# Patient Record
Sex: FEMALE | Race: OTHER | HISPANIC OR LATINO | URBAN - METROPOLITAN AREA
[De-identification: names, ages, dates, MRNs, and addresses within clinical notes are randomized per-mention and may not be internally consistent; named-entity substitution may affect disease eponyms.]

---

## 2018-03-09 ENCOUNTER — INPATIENT (INPATIENT)
Facility: HOSPITAL | Age: 53
LOS: 2 days | Discharge: HOME | DRG: 313 | End: 2018-03-12
Attending: INTERNAL MEDICINE | Admitting: INTERNAL MEDICINE
Payer: COMMERCIAL

## 2018-03-09 VITALS
HEIGHT: 60 IN | TEMPERATURE: 98 F | DIASTOLIC BLOOD PRESSURE: 80 MMHG | HEART RATE: 82 BPM | RESPIRATION RATE: 18 BRPM | OXYGEN SATURATION: 99 % | WEIGHT: 141.1 LBS | SYSTOLIC BLOOD PRESSURE: 124 MMHG

## 2018-03-09 DIAGNOSIS — Z98.890 OTHER SPECIFIED POSTPROCEDURAL STATES: Chronic | ICD-10-CM

## 2018-03-09 LAB
ALBUMIN SERPL ELPH-MCNC: 4.7 G/DL — SIGNIFICANT CHANGE UP (ref 3.3–5)
ALP SERPL-CCNC: 86 U/L — SIGNIFICANT CHANGE UP (ref 40–120)
ALT FLD-CCNC: 28 U/L — SIGNIFICANT CHANGE UP (ref 10–45)
ANION GAP SERPL CALC-SCNC: 14 MMOL/L — SIGNIFICANT CHANGE UP (ref 5–17)
AST SERPL-CCNC: 19 U/L — SIGNIFICANT CHANGE UP (ref 10–40)
BASOPHILS NFR BLD AUTO: 0.3 % — SIGNIFICANT CHANGE UP (ref 0–2)
BILIRUB SERPL-MCNC: 0.5 MG/DL — SIGNIFICANT CHANGE UP (ref 0.2–1.2)
BUN SERPL-MCNC: 14 MG/DL — SIGNIFICANT CHANGE UP (ref 7–23)
CALCIUM SERPL-MCNC: 9.8 MG/DL — SIGNIFICANT CHANGE UP (ref 8.4–10.5)
CHLORIDE SERPL-SCNC: 98 MMOL/L — SIGNIFICANT CHANGE UP (ref 96–108)
CK MB CFR SERPL CALC: 1.4 NG/ML — SIGNIFICANT CHANGE UP (ref 0–6.7)
CK SERPL-CCNC: 59 U/L — SIGNIFICANT CHANGE UP (ref 25–170)
CO2 SERPL-SCNC: 26 MMOL/L — SIGNIFICANT CHANGE UP (ref 22–31)
CREAT SERPL-MCNC: 0.68 MG/DL — SIGNIFICANT CHANGE UP (ref 0.5–1.3)
D DIMER BLD IA.RAPID-MCNC: 316 NG/ML DDU — HIGH
EOSINOPHIL NFR BLD AUTO: 0.4 % — SIGNIFICANT CHANGE UP (ref 0–6)
EXTRA BLUE TOP TUBE: SIGNIFICANT CHANGE UP
EXTRA SST TUBE: SIGNIFICANT CHANGE UP
GLUCOSE BLDC GLUCOMTR-MCNC: 104 MG/DL — HIGH (ref 70–99)
GLUCOSE SERPL-MCNC: 118 MG/DL — HIGH (ref 70–99)
HCG UR QL: NEGATIVE — SIGNIFICANT CHANGE UP
HCT VFR BLD CALC: 41.7 % — SIGNIFICANT CHANGE UP (ref 34.5–45)
HGB BLD-MCNC: 13.2 G/DL — SIGNIFICANT CHANGE UP (ref 11.5–15.5)
LYMPHOCYTES # BLD AUTO: 29.4 % — SIGNIFICANT CHANGE UP (ref 13–44)
MCHC RBC-ENTMCNC: 28.1 PG — SIGNIFICANT CHANGE UP (ref 27–34)
MCHC RBC-ENTMCNC: 31.7 G/DL — LOW (ref 32–36)
MCV RBC AUTO: 88.9 FL — SIGNIFICANT CHANGE UP (ref 80–100)
MONOCYTES NFR BLD AUTO: 7.5 % — SIGNIFICANT CHANGE UP (ref 2–14)
NEUTROPHILS NFR BLD AUTO: 62.4 % — SIGNIFICANT CHANGE UP (ref 43–77)
PLATELET # BLD AUTO: 237 K/UL — SIGNIFICANT CHANGE UP (ref 150–400)
POTASSIUM SERPL-MCNC: 3.8 MMOL/L — SIGNIFICANT CHANGE UP (ref 3.5–5.3)
POTASSIUM SERPL-SCNC: 3.8 MMOL/L — SIGNIFICANT CHANGE UP (ref 3.5–5.3)
PROT SERPL-MCNC: 7.9 G/DL — SIGNIFICANT CHANGE UP (ref 6–8.3)
RBC # BLD: 4.69 M/UL — SIGNIFICANT CHANGE UP (ref 3.8–5.2)
RBC # FLD: 13.5 % — SIGNIFICANT CHANGE UP (ref 10.3–16.9)
SODIUM SERPL-SCNC: 138 MMOL/L — SIGNIFICANT CHANGE UP (ref 135–145)
TROPONIN T SERPL-MCNC: <0.01 NG/ML — SIGNIFICANT CHANGE UP (ref 0–0.01)
WBC # BLD: 10.3 K/UL — SIGNIFICANT CHANGE UP (ref 3.8–10.5)
WBC # FLD AUTO: 10.3 K/UL — SIGNIFICANT CHANGE UP (ref 3.8–10.5)

## 2018-03-09 PROCEDURE — 78452 HT MUSCLE IMAGE SPECT MULT: CPT | Mod: 26

## 2018-03-09 PROCEDURE — 93010 ELECTROCARDIOGRAM REPORT: CPT

## 2018-03-09 PROCEDURE — 93016 CV STRESS TEST SUPVJ ONLY: CPT

## 2018-03-09 PROCEDURE — 93018 CV STRESS TEST I&R ONLY: CPT

## 2018-03-09 PROCEDURE — 71045 X-RAY EXAM CHEST 1 VIEW: CPT | Mod: 26

## 2018-03-09 PROCEDURE — 99285 EMERGENCY DEPT VISIT HI MDM: CPT | Mod: 25

## 2018-03-09 RX ORDER — ASPIRIN/CALCIUM CARB/MAGNESIUM 324 MG
325 TABLET ORAL ONCE
Qty: 0 | Refills: 0 | Status: COMPLETED | OUTPATIENT
Start: 2018-03-09 | End: 2018-03-09

## 2018-03-09 RX ORDER — ASPIRIN/CALCIUM CARB/MAGNESIUM 324 MG
1 TABLET ORAL
Qty: 0 | Refills: 0 | COMMUNITY

## 2018-03-09 RX ORDER — ACETAMINOPHEN 500 MG
650 TABLET ORAL ONCE
Qty: 0 | Refills: 0 | Status: COMPLETED | OUTPATIENT
Start: 2018-03-09 | End: 2018-03-09

## 2018-03-09 RX ORDER — DEXTROSE 50 % IN WATER 50 %
1 SYRINGE (ML) INTRAVENOUS ONCE
Qty: 0 | Refills: 0 | Status: DISCONTINUED | OUTPATIENT
Start: 2018-03-09 | End: 2018-03-12

## 2018-03-09 RX ORDER — DEXTROSE 50 % IN WATER 50 %
25 SYRINGE (ML) INTRAVENOUS ONCE
Qty: 0 | Refills: 0 | Status: DISCONTINUED | OUTPATIENT
Start: 2018-03-09 | End: 2018-03-12

## 2018-03-09 RX ORDER — ASPIRIN/CALCIUM CARB/MAGNESIUM 324 MG
81 TABLET ORAL DAILY
Qty: 0 | Refills: 0 | Status: DISCONTINUED | OUTPATIENT
Start: 2018-03-09 | End: 2018-03-12

## 2018-03-09 RX ORDER — METOCLOPRAMIDE HCL 10 MG
10 TABLET ORAL ONCE
Qty: 0 | Refills: 0 | Status: COMPLETED | OUTPATIENT
Start: 2018-03-09 | End: 2018-03-09

## 2018-03-09 RX ORDER — INSULIN LISPRO 100/ML
VIAL (ML) SUBCUTANEOUS
Qty: 0 | Refills: 0 | Status: DISCONTINUED | OUTPATIENT
Start: 2018-03-09 | End: 2018-03-12

## 2018-03-09 RX ORDER — GLUCAGON INJECTION, SOLUTION 0.5 MG/.1ML
1 INJECTION, SOLUTION SUBCUTANEOUS ONCE
Qty: 0 | Refills: 0 | Status: DISCONTINUED | OUTPATIENT
Start: 2018-03-09 | End: 2018-03-12

## 2018-03-09 RX ORDER — METOPROLOL TARTRATE 50 MG
25 TABLET ORAL DAILY
Qty: 0 | Refills: 0 | Status: DISCONTINUED | OUTPATIENT
Start: 2018-03-09 | End: 2018-03-12

## 2018-03-09 RX ORDER — ATORVASTATIN CALCIUM 80 MG/1
10 TABLET, FILM COATED ORAL AT BEDTIME
Qty: 0 | Refills: 0 | Status: DISCONTINUED | OUTPATIENT
Start: 2018-03-09 | End: 2018-03-10

## 2018-03-09 RX ORDER — NITROGLYCERIN 6.5 MG
0.4 CAPSULE, EXTENDED RELEASE ORAL
Qty: 0 | Refills: 0 | Status: DISCONTINUED | OUTPATIENT
Start: 2018-03-09 | End: 2018-03-12

## 2018-03-09 RX ORDER — SODIUM CHLORIDE 9 MG/ML
1000 INJECTION, SOLUTION INTRAVENOUS
Qty: 0 | Refills: 0 | Status: DISCONTINUED | OUTPATIENT
Start: 2018-03-09 | End: 2018-03-12

## 2018-03-09 RX ORDER — DEXTROSE 50 % IN WATER 50 %
12.5 SYRINGE (ML) INTRAVENOUS ONCE
Qty: 0 | Refills: 0 | Status: DISCONTINUED | OUTPATIENT
Start: 2018-03-09 | End: 2018-03-12

## 2018-03-09 RX ORDER — SUMATRIPTAN SUCCINATE 4 MG/.5ML
25 INJECTION, SOLUTION SUBCUTANEOUS ONCE
Qty: 0 | Refills: 0 | Status: COMPLETED | OUTPATIENT
Start: 2018-03-09 | End: 2018-03-09

## 2018-03-09 RX ADMIN — SUMATRIPTAN SUCCINATE 25 MILLIGRAM(S): 4 INJECTION, SOLUTION SUBCUTANEOUS at 22:38

## 2018-03-09 RX ADMIN — Medication 325 MILLIGRAM(S): at 18:45

## 2018-03-09 RX ADMIN — Medication 650 MILLIGRAM(S): at 17:41

## 2018-03-09 RX ADMIN — Medication 0.4 MILLIGRAM(S): at 19:35

## 2018-03-09 RX ADMIN — ATORVASTATIN CALCIUM 10 MILLIGRAM(S): 80 TABLET, FILM COATED ORAL at 22:38

## 2018-03-09 RX ADMIN — SUMATRIPTAN SUCCINATE 25 MILLIGRAM(S): 4 INJECTION, SOLUTION SUBCUTANEOUS at 23:15

## 2018-03-09 RX ADMIN — Medication 10 MILLIGRAM(S): at 19:31

## 2018-03-09 NOTE — H&P ADULT - HISTORY OF PRESENT ILLNESS
12 Lead EKG reveals SR NSST changes with PVC-Left bundle morphology Patient is a 52 year old female with PMHx of HLD, DM strong family history of premature CAD who presents to [facility] ED With complaints of chest pain and plalpitations x 3 weeks. Patient reports tightness in the center of chest with no radiation and simultaneously palpitations throughout the day. No aggravating or relieving factors. She admits to ocassional SOB. Denies orthopne, PND, Le edema or syncope. No prior history of CAD/CHF/Arrythmia. 12 Lead EKG reveals SR NSST changes with PVC-Left bundle morphology. troponin negx1 Patient is a 52 year old female with PMHx of HLD, DM strong family history of premature CAD who presents to [facility] ED With complaints of chest pain and plalpitations x 3 weeks. Patient reports tightness in the center of chest with no radiation and simultaneously palpitations throughout the day. No aggravating or relieving factors. She admits to ocassional SOB. Denies orthopnea PND, Le edema or syncope. No prior history of CAD/CHF/Arrythmia. 12 Lead EKG reveals SR NSST changes with PVC-Left bundle morphology. troponin negx1, lytes unremarkable.

## 2018-03-09 NOTE — ED PROVIDER NOTE - ATTENDING CONTRIBUTION TO CARE
53 yo female h/o dm c/o intermittent sscp (tightness) w palpitations x3 weeks, worse w exertion.  No associated sob, n, diaphoresis, dizziness.  Pt noted to have abnl ekg w pmd and sent to cardiologist who noted similar.  Pt states she received an echo (unknown results) .  Sx slightly more intense x 3 d so pt came to ed.  Pt denies  fever, chills, smoking hx, ocp use, recent travel, sick contacts, past surgery, trauma, recent immobilization, fh pe/dvt.  + fh cad.  Well appearing, nad, nc/at, lung cta, heart reg, abd soft/nt, ext no c/c/e/ttp bilat, no gross neuro deficits.  ? acs, arrhythmia, no sig concern for pe, pna, pericarditis.  CXR, labs wnl; discussed w Dr Cabral who wants to admit for further cardiac eval.

## 2018-03-09 NOTE — H&P ADULT - RS GEN PE MLT RESP DETAILS PC
airway patent/good air movement/normal/no rales/breath sounds equal/no chest wall tenderness/no rhonchi/no wheezes

## 2018-03-09 NOTE — ED PROVIDER NOTE - OBJECTIVE STATEMENT
51 y/o female with a PMHx of DM2 (on janumet) is present with intermittent chest tightness and palpitations x3 weeks, however reports increased symptoms x3 days with nausea that occurred today. Pt reports she saw her PMD, Dr. Mccann who noticed "skipped beats" on her EKG and she was referred to Cardiology. As per pt, she received an echo (unknown results) and another EKG (with abnormal results). Pt describes a "chest tightness/discomfort" located mid-sternal that has become increasingly constant. 51 y/o female with a PMHx of DM2 (on janumet) is present with intermittent chest tightness and palpitations x3 weeks, however reports increased symptoms x3 days with nausea that occurred today. Pt reports she saw her PMD, Dr. Mccann who noticed "skipped beats" on her EKG and she was referred to Cardiology. As per pt, she received an echo (unknown results) and another EKG (with abnormal results). Pt describes a "chest tightness/discomfort" located mid-sternal that has become increasingly constant. She denies the following associating symptoms: sweating, sob, difficulty breathing. 53 y/o female with a PMHx of DM2 (on janumet) is present with intermittent chest tightness and palpitations x3 weeks, however reports increased symptoms x3 days with nausea that occurred today. Pt reports she saw her PMD, Dr. Mccann who noticed "skipped beats" on her EKG and she was referred to Cardiology. As per pt, she received an echo (unknown results) and another EKG (with abnormal results). Pt describes a "chest tightness/discomfort" located mid-sternal that has become increasingly constant. She reports pain is worsened when she lays on her side. Denies pain with lying on her back. She denies the following associating symptoms: sweating, sob, difficulty breathing. In addition, she denies the following: recent illness, fever, chills, smoking hx, ocp use, recent travel, sick contacts, past surgery, trauma.

## 2018-03-09 NOTE — H&P ADULT - FAMILY HISTORY
No pertinent family history in first degree relatives Father  Still living? Unknown  Family history of premature CAD, Age at diagnosis: 41-50     Mother  Still living? Unknown  Family history of premature CAD, Age at diagnosis: 41-50     Sibling  Still living? Unknown  Family history of premature CAD, Age at diagnosis: 41-50

## 2018-03-09 NOTE — H&P ADULT - ASSESSMENT
Admit to tele  Serial Trops  Check Echo to eval LVEF  Consider Ischemic eval with nuc Re- Chest pain and PVCs  Check ESR/CRP  Check Lipids  Check TSH  Add ASA 81  Insulin SS Admit to tele  Serial Trops  Check Echo to eval LVEF  Consider Ischemic eval with nuc Re- Chest pain and PVCs  Check ESR/CRP  Check Lipids  Check TSH  Add ASA 81  Add Low dose BB Toprol XL 25  Insulin SS Patient is a 52 year old female with PMHx of HLD, DM strong family history of premature CAD who presents to [facility] ED With complaints of chest pain and plalpitations x 3 weeks. Patient reports tightness in the center of chest with no radiation and simultaneously palpitations throughout the day. No aggravating or relieving factors. She admits to ocassional SOB. Denies orthopnea PND, Le edema or syncope. No prior history of CAD/CHF/Arrythmia. 12 Lead EKG reveals SR NSST changes with PVC-Left bundle morphology. troponin negx1, lytes unremarkable.         Admit to tele  Serial Trops  Check Echo to eval LVEF  Consider Ischemic eval with nuc Re- Chest pain and PVCs  Check ESR/CRP  Check Lipids  Check TSH  Check D-Dimer  Add ASA 81  Add Low dose BB Toprol XL 25  Insulin SS

## 2018-03-09 NOTE — PROGRESS NOTE ADULT - SUBJECTIVE AND OBJECTIVE BOX
Pt is c/o 3 weeks of palpitation s chest tightness and mild dyspnea   No previous cardaic history but Pt is diabetic     PAST MEDICAL & SURGICAL HISTORY:  Migraines  Diabetes type 2, controlled  H/O knee surgery      MEDICATIONS  (STANDING):    MEDICATIONS  (PRN):    Home Medications:  aspirin 81 mg oral tablet: 1 tab(s) orally once a day (09 Mar 2018 17:43)  atorvastatin:  (09 Mar 2018 17:43)  Janumet:  (09 Mar 2018 17:43)    ICU Vital Signs Last 24 Hrs  T(C): 36.8 (09 Mar 2018 16:50), Max: 36.8 (09 Mar 2018 16:50)  T(F): 98.2 (09 Mar 2018 16:50), Max: 98.2 (09 Mar 2018 16:50)  HR: 82 (09 Mar 2018 16:50) (82 - 82)  BP: 124/80 (09 Mar 2018 16:50) (124/80 - 124/80)  BP(mean): --  ABP: --  ABP(mean): --  RR: 18 (09 Mar 2018 16:50) (18 - 18)  SpO2: 99% (09 Mar 2018 16:50) (99% - 99%)      lungs clear  CV s1 s2   abd soft   ext stable     EKG   NSR   old septal infarct

## 2018-03-09 NOTE — ED PROVIDER NOTE - MEDICAL DECISION MAKING DETAILS
53 y/o female with increasing intermittent chest tightness and palpitations with previous abnormal EKGs. VS nml. Skipped heart sounds on auscultation. Lung sounds clear. Pending CE. Consulted with Dr. Cabral - plan for admission for ACS. 51 y/o female with increasing intermittent chest tightness and palpitations with previous abnormal EKGs. VS nml. Skipped heart sounds on auscultation. Lung sounds clear. Pending CE. EKG with PVC - no stemi. Consulted with Dr. Cabral - plan for admission for ACS. Monitor for arrythmia and plan for echo. 51 y/o female with increasing intermittent chest tightness and palpitations with previous abnormal EKGs. VS nml. Skipped heart sounds on auscultation. Lung sounds clear. CE negative. EKG with PVC - no stemi. HA treated with reglan and tylenol. Chest pain treated with ASA and nitro. Consulted with Dr. Cabral - plan for admission for ACS. Monitor for arrythmia and plan for echo.

## 2018-03-09 NOTE — ED ADULT TRIAGE NOTE - ARRIVAL INFO ADDITIONAL COMMENTS
c.o chest pain and palpitations for 3 weeks. seen by pmd and has an appt with cardiology next week. states pain has been worse for past 3 days. denies any sob, dizziness, cough, fever/chills. c.o nausea since this morning, no vomiting.

## 2018-03-09 NOTE — H&P ADULT - NSHPLABSRESULTS_GEN_ALL_CORE
Troponin T, Serum (03.09.18 @ 17:51)    Troponin T, Serum: <0.01: Reference interval for troponin T is </= 0.01 ng/mL which includes the  99th percentile of a healthy population. Troponin T results are not  interchangeable with troponin I results. ng/mL

## 2018-03-10 DIAGNOSIS — E11.9 TYPE 2 DIABETES MELLITUS WITHOUT COMPLICATIONS: ICD-10-CM

## 2018-03-10 DIAGNOSIS — R79.89 OTHER SPECIFIED ABNORMAL FINDINGS OF BLOOD CHEMISTRY: ICD-10-CM

## 2018-03-10 LAB
ANION GAP SERPL CALC-SCNC: 12 MMOL/L — SIGNIFICANT CHANGE UP (ref 5–17)
BUN SERPL-MCNC: 12 MG/DL — SIGNIFICANT CHANGE UP (ref 7–23)
CALCIUM SERPL-MCNC: 9.2 MG/DL — SIGNIFICANT CHANGE UP (ref 8.4–10.5)
CHLORIDE SERPL-SCNC: 98 MMOL/L — SIGNIFICANT CHANGE UP (ref 96–108)
CHOLEST SERPL-MCNC: 202 MG/DL — HIGH (ref 10–199)
CO2 SERPL-SCNC: 27 MMOL/L — SIGNIFICANT CHANGE UP (ref 22–31)
CREAT SERPL-MCNC: 0.75 MG/DL — SIGNIFICANT CHANGE UP (ref 0.5–1.3)
GLUCOSE BLDC GLUCOMTR-MCNC: 119 MG/DL — HIGH (ref 70–99)
GLUCOSE BLDC GLUCOMTR-MCNC: 129 MG/DL — HIGH (ref 70–99)
GLUCOSE BLDC GLUCOMTR-MCNC: 131 MG/DL — HIGH (ref 70–99)
GLUCOSE BLDC GLUCOMTR-MCNC: 136 MG/DL — HIGH (ref 70–99)
GLUCOSE SERPL-MCNC: 160 MG/DL — HIGH (ref 70–99)
HBA1C BLD-MCNC: 7.4 % — HIGH (ref 4–5.6)
HCT VFR BLD CALC: 40.5 % — SIGNIFICANT CHANGE UP (ref 34.5–45)
HDLC SERPL-MCNC: 49 MG/DL — SIGNIFICANT CHANGE UP (ref 40–125)
HGB BLD-MCNC: 13.1 G/DL — SIGNIFICANT CHANGE UP (ref 11.5–15.5)
LIPID PNL WITH DIRECT LDL SERPL: 130 MG/DL — HIGH
MCHC RBC-ENTMCNC: 28.9 PG — SIGNIFICANT CHANGE UP (ref 27–34)
MCHC RBC-ENTMCNC: 32.3 G/DL — SIGNIFICANT CHANGE UP (ref 32–36)
MCV RBC AUTO: 89.4 FL — SIGNIFICANT CHANGE UP (ref 80–100)
PLATELET # BLD AUTO: 211 K/UL — SIGNIFICANT CHANGE UP (ref 150–400)
POTASSIUM SERPL-MCNC: 4.2 MMOL/L — SIGNIFICANT CHANGE UP (ref 3.5–5.3)
POTASSIUM SERPL-SCNC: 4.2 MMOL/L — SIGNIFICANT CHANGE UP (ref 3.5–5.3)
RBC # BLD: 4.53 M/UL — SIGNIFICANT CHANGE UP (ref 3.8–5.2)
RBC # FLD: 13.4 % — SIGNIFICANT CHANGE UP (ref 10.3–16.9)
SODIUM SERPL-SCNC: 137 MMOL/L — SIGNIFICANT CHANGE UP (ref 135–145)
TOTAL CHOLESTEROL/HDL RATIO MEASUREMENT: 4.1 RATIO — SIGNIFICANT CHANGE UP (ref 3.3–7.1)
TRIGL SERPL-MCNC: 113 MG/DL — SIGNIFICANT CHANGE UP (ref 10–149)
TROPONIN T SERPL-MCNC: <0.01 NG/ML — SIGNIFICANT CHANGE UP (ref 0–0.01)
TSH SERPL-MCNC: 1.35 UIU/ML — SIGNIFICANT CHANGE UP (ref 0.35–4.94)
WBC # BLD: 7.1 K/UL — SIGNIFICANT CHANGE UP (ref 3.8–10.5)
WBC # FLD AUTO: 7.1 K/UL — SIGNIFICANT CHANGE UP (ref 3.8–10.5)

## 2018-03-10 RX ORDER — ATORVASTATIN CALCIUM 80 MG/1
40 TABLET, FILM COATED ORAL AT BEDTIME
Qty: 0 | Refills: 0 | Status: DISCONTINUED | OUTPATIENT
Start: 2018-03-10 | End: 2018-03-12

## 2018-03-10 RX ORDER — HYDROCORTISONE 1 %
1 OINTMENT (GRAM) TOPICAL ONCE
Qty: 0 | Refills: 0 | Status: COMPLETED | OUTPATIENT
Start: 2018-03-10 | End: 2018-03-10

## 2018-03-10 RX ORDER — HEPARIN SODIUM 5000 [USP'U]/ML
5000 INJECTION INTRAVENOUS; SUBCUTANEOUS EVERY 8 HOURS
Qty: 0 | Refills: 0 | Status: DISCONTINUED | OUTPATIENT
Start: 2018-03-10 | End: 2018-03-12

## 2018-03-10 RX ADMIN — Medication 81 MILLIGRAM(S): at 11:49

## 2018-03-10 RX ADMIN — ATORVASTATIN CALCIUM 40 MILLIGRAM(S): 80 TABLET, FILM COATED ORAL at 22:17

## 2018-03-10 RX ADMIN — Medication 1 APPLICATION(S): at 22:17

## 2018-03-10 RX ADMIN — Medication 25 MILLIGRAM(S): at 06:24

## 2018-03-10 NOTE — PROGRESS NOTE ADULT - SUBJECTIVE AND OBJECTIVE BOX
Pt aware of palpitations     PAST MEDICAL & SURGICAL HISTORY:  Migraines  Diabetes type 2, controlled  H/O knee surgery    MEDICATIONS  (STANDING):  aspirin enteric coated 81 milliGRAM(s) Oral daily  atorvastatin 10 milliGRAM(s) Oral at bedtime  dextrose 5%. 1000 milliLiter(s) (50 mL/Hr) IV Continuous <Continuous>  dextrose 50% Injectable 12.5 Gram(s) IV Push once  dextrose 50% Injectable 25 Gram(s) IV Push once  dextrose 50% Injectable 25 Gram(s) IV Push once  insulin lispro (HumaLOG) corrective regimen sliding scale   SubCutaneous Before meals and at bedtime  metoprolol succinate ER 25 milliGRAM(s) Oral daily    MEDICATIONS  (PRN):  dextrose Gel 1 Dose(s) Oral once PRN Blood Glucose LESS THAN 70 milliGRAM(s)/deciliter  glucagon  Injectable 1 milliGRAM(s) IntraMuscular once PRN Glucose LESS THAN 70 milligrams/deciliter  nitroglycerin     SubLingual 0.4 milliGRAM(s) SubLingual every 5 minutes PRN Chest Pain    ICU Vital Signs Last 24 Hrs  T(C): 35.7 (10 Mar 2018 10:00), Max: 36.9 (09 Mar 2018 19:35)  T(F): 96.2 (10 Mar 2018 10:00), Max: 98.4 (09 Mar 2018 19:35)  HR: 79 (10 Mar 2018 08:30) (68 - 82)  BP: 121/78 (10 Mar 2018 08:30) (107/56 - 135/69)  BP(mean): 94 (09 Mar 2018 19:35) (94 - 94)  ABP: --  ABP(mean): --  RR: 18 (10 Mar 2018 08:30) (16 - 18)  SpO2: 98% (10 Mar 2018 08:30) (98% - 99%)      lungs clear  CV s1 s2  RSR with ectopy   Abd soft  Ext stable                          13.1   7.1   )-----------( 211      ( 10 Mar 2018 07:34 )             40.5   03-10    137  |  98  |  12  ----------------------------<  160<H>  4.2   |  27  |  0.75    Ca    9.2      10 Mar 2018 07:34    TPro  7.9  /  Alb  4.7  /  TBili  0.5  /  DBili  x   /  AST  19  /  ALT  28  /  AlkPhos  86  03-09  PT/INR - ( 09 Mar 2018 17:51 )   PT: 12.3 sec;   INR: 1.11          PTT - ( 09 Mar 2018 17:51 )  PTT:31.2 secCARDIAC MARKERS ( 10 Mar 2018 07:34 )  x     / <0.01 ng/mL / x     / x     / x      CARDIAC MARKERS ( 09 Mar 2018 17:51 )  x     / <0.01 ng/mL / 59 U/L / x     / 1.4 ng/mL

## 2018-03-10 NOTE — PROVIDER CONTACT NOTE (OTHER) - ASSESSMENT
c/o itchiness  no redness present but pt states this has happened before and she believes she has an allergy to the adhesive

## 2018-03-10 NOTE — PROGRESS NOTE ADULT - ASSESSMENT
Patient is a 53yo F with strong FHx premature CAD and PMHx of HLD, DM-II who presented to [facility] ED on 3/9/18 complaining of CP artur plalpitations x 3 weeks. Patient reports tightness in the center of chest with no radiation and simultaneously palpitations throughout the day. No aggravating or relieving factors. She admits to ocassional SOB. Denies orthopnea PND, Le edema or syncope. No prior history of CAD/CHF/Arrythmia. 12 Lead EKG reveals SR NSST changes with PVC-Left bundle morphology. troponin negx1, lytes unremarkable. Patient is a 51yo F with strong FHx premature CAD and PMHx of HLD, DM-II who presented to [facility] ED on 3/9/18 complaining of CP described as a tightness and palpitations x 3 weeks. Patient was admitted to [facility] for further cardiac workup.

## 2018-03-10 NOTE — PROGRESS NOTE ADULT - SUBJECTIVE AND OBJECTIVE BOX
Interventional Cardiology PA Adult Progress Note    CC: "I am having palpitations"    Subjective Assessment: Patient was seen and examined this AM and reported mild palpitations while walking to the bathroom. Denies CP or SOB.    12 point ROS otherwise negative except per subjective  	  MEDICATIONS:  metoprolol succinate ER 25 milliGRAM(s) Oral daily  nitroglycerin     SubLingual 0.4 milliGRAM(s) SubLingual every 5 minutes PRN  atorvastatin 10 milliGRAM(s) Oral at bedtime  insulin lispro (HumaLOG) corrective regimen sliding scale   SubCutaneous Before meals and at bedtime  aspirin enteric coated 81 milliGRAM(s) Oral daily  	    [PHYSICAL EXAM:  TELEMETRY:  T(C): 35.7 (03-10-18 @ 10:00), Max: 36.9 (03-09-18 @ 19:35)  HR: 79 (03-10-18 @ 08:30) (68 - 82)  BP: 121/78 (03-10-18 @ 08:30) (107/56 - 135/69)  RR: 18 (03-10-18 @ 08:30) (16 - 18)  SpO2: 98% (03-10-18 @ 08:30) (98% - 99%)  Wt(kg): --  I&O's Summary    10 Mar 2018 06:01  -  10 Mar 2018 12:52  --------------------------------------------------------  IN: 240 mL / OUT: 0 mL / NET: 240 mL      Height (cm): 152.4 (03-09 @ 16:50)  Weight (kg): 64 (03-09 @ 16:50)  BMI (kg/m2): 27.6 (03-09 @ 16:50)  BSA (m2): 1.61 (03-09 @ 16:50)                            Appearance: Normal	  HEENT:   Normal oral mucosa, PERRL, EOMI	  Neck: Supple, - JVD; No Carotid Bruit   Cardiovascular: Normal S1 S2, No JVD, No murmurs  Respiratory: Lungs clear to auscultation, No Rales, Rhonchi, Wheezing	  Gastrointestinal:  Soft, Non-tender, + BS	  Skin: No rashes, No ecchymoses, No cyanosis  Extremities: Normal range of motion, No clubbing, cyanosis or edema  Vascular: Peripheral pulses palpable 2+ bilaterally  Neurologic: Non-focal  Psychiatry: A & O x 3, Mood & affect appropriate	    LABS:	 	  CARDIAC MARKERS:                          13.1   7.1   )-----------( 211      ( 10 Mar 2018 07:34 )             40.5     03-10    137  |  98  |  12  ----------------------------<  160<H>  4.2   |  27  |  0.75    Ca    9.2      10 Mar 2018 07:34    TPro  7.9  /  Alb  4.7  /  TBili  0.5  /  DBili  x   /  AST  19  /  ALT  28  /  AlkPhos  86  03-09    HgA1c: Hemoglobin A1C, Whole Blood: 7.4 % (03-10 @ 07:34)    TSH: Thyroid Stimulating Hormone, Serum: 1.352 uIU/mL (03-10 @ 07:34)    PT/INR - ( 09 Mar 2018 17:51 )   PT: 12.3 sec;   INR: 1.11          PTT - ( 09 Mar 2018 17:51 )  PTT:31.2 sec

## 2018-03-11 DIAGNOSIS — R19.7 DIARRHEA, UNSPECIFIED: ICD-10-CM

## 2018-03-11 LAB
ANION GAP SERPL CALC-SCNC: 11 MMOL/L — SIGNIFICANT CHANGE UP (ref 5–17)
BUN SERPL-MCNC: 14 MG/DL — SIGNIFICANT CHANGE UP (ref 7–23)
CALCIUM SERPL-MCNC: 9.4 MG/DL — SIGNIFICANT CHANGE UP (ref 8.4–10.5)
CHLORIDE SERPL-SCNC: 101 MMOL/L — SIGNIFICANT CHANGE UP (ref 96–108)
CO2 SERPL-SCNC: 29 MMOL/L — SIGNIFICANT CHANGE UP (ref 22–31)
CREAT SERPL-MCNC: 0.73 MG/DL — SIGNIFICANT CHANGE UP (ref 0.5–1.3)
GLUCOSE BLDC GLUCOMTR-MCNC: 122 MG/DL — HIGH (ref 70–99)
GLUCOSE BLDC GLUCOMTR-MCNC: 124 MG/DL — HIGH (ref 70–99)
GLUCOSE BLDC GLUCOMTR-MCNC: 151 MG/DL — HIGH (ref 70–99)
GLUCOSE BLDC GLUCOMTR-MCNC: 157 MG/DL — HIGH (ref 70–99)
GLUCOSE BLDC GLUCOMTR-MCNC: 180 MG/DL — HIGH (ref 70–99)
GLUCOSE SERPL-MCNC: 139 MG/DL — HIGH (ref 70–99)
HCT VFR BLD CALC: 42.8 % — SIGNIFICANT CHANGE UP (ref 34.5–45)
HGB BLD-MCNC: 13.6 G/DL — SIGNIFICANT CHANGE UP (ref 11.5–15.5)
MAGNESIUM SERPL-MCNC: 2 MG/DL — SIGNIFICANT CHANGE UP (ref 1.6–2.6)
MCHC RBC-ENTMCNC: 29.1 PG — SIGNIFICANT CHANGE UP (ref 27–34)
MCHC RBC-ENTMCNC: 31.8 G/DL — LOW (ref 32–36)
MCV RBC AUTO: 91.6 FL — SIGNIFICANT CHANGE UP (ref 80–100)
PLATELET # BLD AUTO: 238 K/UL — SIGNIFICANT CHANGE UP (ref 150–400)
POTASSIUM SERPL-MCNC: 4.4 MMOL/L — SIGNIFICANT CHANGE UP (ref 3.5–5.3)
POTASSIUM SERPL-SCNC: 4.4 MMOL/L — SIGNIFICANT CHANGE UP (ref 3.5–5.3)
RBC # BLD: 4.67 M/UL — SIGNIFICANT CHANGE UP (ref 3.8–5.2)
RBC # FLD: 13.7 % — SIGNIFICANT CHANGE UP (ref 10.3–16.9)
SODIUM SERPL-SCNC: 141 MMOL/L — SIGNIFICANT CHANGE UP (ref 135–145)
TROPONIN T SERPL-MCNC: <0.01 NG/ML — SIGNIFICANT CHANGE UP (ref 0–0.01)
WBC # BLD: 8 K/UL — SIGNIFICANT CHANGE UP (ref 3.8–10.5)
WBC # FLD AUTO: 8 K/UL — SIGNIFICANT CHANGE UP (ref 3.8–10.5)

## 2018-03-11 PROCEDURE — 93970 EXTREMITY STUDY: CPT | Mod: 26

## 2018-03-11 RX ORDER — ACETAMINOPHEN 500 MG
650 TABLET ORAL ONCE
Qty: 0 | Refills: 0 | Status: DISCONTINUED | OUTPATIENT
Start: 2018-03-11 | End: 2018-03-12

## 2018-03-11 RX ADMIN — Medication 25 MILLIGRAM(S): at 06:17

## 2018-03-11 RX ADMIN — Medication 1: at 21:38

## 2018-03-11 RX ADMIN — ATORVASTATIN CALCIUM 40 MILLIGRAM(S): 80 TABLET, FILM COATED ORAL at 21:38

## 2018-03-11 RX ADMIN — Medication 81 MILLIGRAM(S): at 13:15

## 2018-03-11 NOTE — PROGRESS NOTE ADULT - PROBLEM SELECTOR PLAN 4
Afebrile, no leukocytosis  - Refused medication at this time  - Will continue to closely monitor    DVT PPX: heparin SQ  DISPO: NPO after MN for NST Monday

## 2018-03-11 NOTE — PROGRESS NOTE ADULT - PROBLEM SELECTOR PLAN 3
D-dimer mildly elevated at 316, pt denies SOB, not tachyepneic or tachycardic, O2 sat 99% RA  - LE duplex ordered, f/u results
d-dimer mildly elevated at 316, pt denies SOB, not tachyepneic or tachycardic, O2 sat 99% RA  - follow up LE duplex    DVT PPX: heparin SQ  DISPO: NST Monday

## 2018-03-11 NOTE — PROGRESS NOTE ADULT - SUBJECTIVE AND OBJECTIVE BOX
Pt is still feeling palpitation     PAST MEDICAL & SURGICAL HISTORY:  Migraines  Diabetes type 2, controlled  H/O knee surgery      MEDICATIONS  (STANDING):  aspirin enteric coated 81 milliGRAM(s) Oral daily  atorvastatin 40 milliGRAM(s) Oral at bedtime  dextrose 5%. 1000 milliLiter(s) (50 mL/Hr) IV Continuous <Continuous>  dextrose 50% Injectable 12.5 Gram(s) IV Push once  dextrose 50% Injectable 25 Gram(s) IV Push once  dextrose 50% Injectable 25 Gram(s) IV Push once  heparin  Injectable 5000 Unit(s) SubCutaneous every 8 hours  insulin lispro (HumaLOG) corrective regimen sliding scale   SubCutaneous Before meals and at bedtime  metoprolol succinate ER 25 milliGRAM(s) Oral daily    MEDICATIONS  (PRN):  dextrose Gel 1 Dose(s) Oral once PRN Blood Glucose LESS THAN 70 milliGRAM(s)/deciliter  glucagon  Injectable 1 milliGRAM(s) IntraMuscular once PRN Glucose LESS THAN 70 milligrams/deciliter  nitroglycerin     SubLingual 0.4 milliGRAM(s) SubLingual every 5 minutes PRN Chest Pain      ICU Vital Signs Last 24 Hrs  T(C): 35.9 (11 Mar 2018 08:35), Max: 36.4 (11 Mar 2018 00:22)  T(F): 96.6 (11 Mar 2018 08:35), Max: 97.5 (11 Mar 2018 00:22)  HR: 71 (11 Mar 2018 08:35) (62 - 82)  BP: 90/55 (11 Mar 2018 08:35) (89/55 - 110/58)  BP(mean): --  ABP: --  ABP(mean): --  RR: 18 (11 Mar 2018 08:35) (18 - 18)  SpO2: 99% (11 Mar 2018 08:35) (98% - 100%)    Lungs clear  CV s1 s2  Abd soft  Ext stable                          13.6   8.0   )-----------( 238      ( 11 Mar 2018 06:32 )             42.8   03-11    141  |  101  |  14  ----------------------------<  139<H>  4.4   |  29  |  0.73    Ca    9.4      11 Mar 2018 06:32  Mg     2.0     03-11    TPro  7.9  /  Alb  4.7  /  TBili  0.5  /  DBili  x   /  AST  19  /  ALT  28  /  AlkPhos  86  03-09  CARDIAC MARKERS ( 11 Mar 2018 06:32 )  x     / <0.01 ng/mL / x     / x     / x      CARDIAC MARKERS ( 10 Mar 2018 07:34 )  x     / <0.01 ng/mL / x     / x     / x      CARDIAC MARKERS ( 09 Mar 2018 17:51 )  x     / <0.01 ng/mL / 59 U/L / x     / 1.4 ng/mL

## 2018-03-11 NOTE — PROGRESS NOTE ADULT - SUBJECTIVE AND OBJECTIVE BOX
Interventional Cardiology PA Adult Progress Note    CC: Chest tightness    Subjective Assessment: Pt seen and examined at bedside. Reports GI upset with associated loose stools overnight. Also experiencing intermittent palpitations.  Denies CP, SOB, dizziness, vomiting.    All other 12-point ROS otherwise negative, unless as stated in the subjective.  	  MEDICATIONS:  metoprolol succinate ER 25 milliGRAM(s) Oral daily  nitroglycerin     SubLingual 0.4 milliGRAM(s) SubLingual every 5 minutes PRN  atorvastatin 40 milliGRAM(s) Oral at bedtime  dextrose 50% Injectable 12.5 Gram(s) IV Push once  dextrose 50% Injectable 25 Gram(s) IV Push once  dextrose 50% Injectable 25 Gram(s) IV Push once  dextrose Gel 1 Dose(s) Oral once PRN  glucagon  Injectable 1 milliGRAM(s) IntraMuscular once PRN  insulin lispro (HumaLOG) corrective regimen sliding scale   SubCutaneous Before meals and at bedtime  aspirin enteric coated 81 milliGRAM(s) Oral daily  dextrose 5%. 1000 milliLiter(s) IV Continuous <Continuous>  heparin  Injectable 5000 Unit(s) SubCutaneous every 8 hours      	    [PHYSICAL EXAM:  TELEMETRY:  T(C): 35.9 (03-11-18 @ 08:35), Max: 36.4 (03-11-18 @ 00:22)  HR: 71 (03-11-18 @ 08:35) (62 - 82)  BP: 90/55 (03-11-18 @ 08:35) (89/55 - 110/58)  RR: 18 (03-11-18 @ 08:35) (18 - 18)  SpO2: 99% (03-11-18 @ 08:35) (98% - 100%)  Wt(kg): --  I&O's Summary    10 Mar 2018 06:01  -  11 Mar 2018 07:00  --------------------------------------------------------  IN: 600 mL / OUT: 0 mL / NET: 600 mL    11 Mar 2018 07:01  -  11 Mar 2018 10:26  --------------------------------------------------------  IN: 180 mL / OUT: 0 mL / NET: 180 mL  Appearance: Normal	  HEENT:   Normal oral mucosa, PERRL, EOMI	  Neck: Supple, No Carotid Bruit   Cardiovascular: Normal S1 S2, No JVD, No murmurs,   Respiratory: Lungs clear to auscultation, No Rales, Rhonchi, Wheezing	  Gastrointestinal:  Soft, Non-tender, + BS	  Extremities: Normal range of motion, No clubbing, cyanosis or edema  Neurologic: Non-focal  Psychiatry: A & O x 3, Mood & affect appropriate      LABS:	 	  CARDIAC MARKERS:               13.6   8.0   )-----------( 238      ( 11 Mar 2018 06:32 )             42.8     03-11    141  |  101  |  14  ----------------------------<  139<H>  4.4   |  29  |  0.73    Ca    9.4      11 Mar 2018 06:32  Mg     2.0     03-11    TPro  7.9  /  Alb  4.7  /  TBili  0.5  /  DBili  x   /  AST  19  /  ALT  28  /  AlkPhos  86  03-09  PT/INR - ( 09 Mar 2018 17:51 )   PT: 12.3 sec;   INR: 1.11     PTT - ( 09 Mar 2018 17:51 )  PTT:31.2 sec

## 2018-03-11 NOTE — PROGRESS NOTE ADULT - PROBLEM SELECTOR PLAN 1
R/O ACS and arrhythmia  - NSR with PVCs on tele, continue to monitor  - trops neg x 2   - TSH wnl  - continue aspirin 81mg QD, increased lipitor from 10mg QHS to 40mg QHS  - Continue Toprol XL 25mg started here for frequent PVCs  - Echo ordered, f/u results  - NPO after MN for NST Monday, 3/11
R/O ACS and arrhythmia  - NSR with PVCs on tele, continue to monitor  - trops neg x 2   - TSH wnl  - continue aspirin 81mg QD, increased lipitor from 10mg QHS to 40mg QHS  - continue toprol XL 25mg started here for frequent PVCs  - follow up echo  - NPO after MN Sunday for NST Monday

## 2018-03-12 ENCOUNTER — TRANSCRIPTION ENCOUNTER (OUTPATIENT)
Age: 53
End: 2018-03-12

## 2018-03-12 VITALS — TEMPERATURE: 97 F

## 2018-03-12 LAB
ANION GAP SERPL CALC-SCNC: 13 MMOL/L — SIGNIFICANT CHANGE UP (ref 5–17)
BASOPHILS NFR BLD AUTO: 0.2 % — SIGNIFICANT CHANGE UP (ref 0–2)
BUN SERPL-MCNC: 17 MG/DL — SIGNIFICANT CHANGE UP (ref 7–23)
CALCIUM SERPL-MCNC: 9.7 MG/DL — SIGNIFICANT CHANGE UP (ref 8.4–10.5)
CHLORIDE SERPL-SCNC: 99 MMOL/L — SIGNIFICANT CHANGE UP (ref 96–108)
CO2 SERPL-SCNC: 25 MMOL/L — SIGNIFICANT CHANGE UP (ref 22–31)
CREAT SERPL-MCNC: 0.78 MG/DL — SIGNIFICANT CHANGE UP (ref 0.5–1.3)
EOSINOPHIL NFR BLD AUTO: 1.5 % — SIGNIFICANT CHANGE UP (ref 0–6)
GLUCOSE BLDC GLUCOMTR-MCNC: 109 MG/DL — HIGH (ref 70–99)
GLUCOSE BLDC GLUCOMTR-MCNC: 122 MG/DL — HIGH (ref 70–99)
GLUCOSE BLDC GLUCOMTR-MCNC: 163 MG/DL — HIGH (ref 70–99)
GLUCOSE SERPL-MCNC: 170 MG/DL — HIGH (ref 70–99)
HCT VFR BLD CALC: 42.4 % — SIGNIFICANT CHANGE UP (ref 34.5–45)
HGB BLD-MCNC: 13.6 G/DL — SIGNIFICANT CHANGE UP (ref 11.5–15.5)
LYMPHOCYTES # BLD AUTO: 40 % — SIGNIFICANT CHANGE UP (ref 13–44)
MAGNESIUM SERPL-MCNC: 2 MG/DL — SIGNIFICANT CHANGE UP (ref 1.6–2.6)
MCHC RBC-ENTMCNC: 29.3 PG — SIGNIFICANT CHANGE UP (ref 27–34)
MCHC RBC-ENTMCNC: 32.1 G/DL — SIGNIFICANT CHANGE UP (ref 32–36)
MCV RBC AUTO: 91.4 FL — SIGNIFICANT CHANGE UP (ref 80–100)
MONOCYTES NFR BLD AUTO: 8.2 % — SIGNIFICANT CHANGE UP (ref 2–14)
NEUTROPHILS NFR BLD AUTO: 50.1 % — SIGNIFICANT CHANGE UP (ref 43–77)
PLATELET # BLD AUTO: 227 K/UL — SIGNIFICANT CHANGE UP (ref 150–400)
POTASSIUM SERPL-MCNC: 4.8 MMOL/L — SIGNIFICANT CHANGE UP (ref 3.5–5.3)
POTASSIUM SERPL-SCNC: 4.8 MMOL/L — SIGNIFICANT CHANGE UP (ref 3.5–5.3)
RBC # BLD: 4.64 M/UL — SIGNIFICANT CHANGE UP (ref 3.8–5.2)
RBC # FLD: 13.6 % — SIGNIFICANT CHANGE UP (ref 10.3–16.9)
SODIUM SERPL-SCNC: 137 MMOL/L — SIGNIFICANT CHANGE UP (ref 135–145)
WBC # BLD: 8.8 K/UL — SIGNIFICANT CHANGE UP (ref 3.8–10.5)
WBC # FLD AUTO: 8.8 K/UL — SIGNIFICANT CHANGE UP (ref 3.8–10.5)

## 2018-03-12 PROCEDURE — A9505: CPT

## 2018-03-12 PROCEDURE — 81025 URINE PREGNANCY TEST: CPT

## 2018-03-12 PROCEDURE — 78452 HT MUSCLE IMAGE SPECT MULT: CPT

## 2018-03-12 PROCEDURE — A9500: CPT

## 2018-03-12 PROCEDURE — 93017 CV STRESS TEST TRACING ONLY: CPT

## 2018-03-12 PROCEDURE — 84443 ASSAY THYROID STIM HORMONE: CPT

## 2018-03-12 PROCEDURE — 93306 TTE W/DOPPLER COMPLETE: CPT | Mod: 26

## 2018-03-12 PROCEDURE — 82553 CREATINE MB FRACTION: CPT

## 2018-03-12 PROCEDURE — 71045 X-RAY EXAM CHEST 1 VIEW: CPT

## 2018-03-12 PROCEDURE — 85379 FIBRIN DEGRADATION QUANT: CPT

## 2018-03-12 PROCEDURE — 85027 COMPLETE CBC AUTOMATED: CPT

## 2018-03-12 PROCEDURE — 93306 TTE W/DOPPLER COMPLETE: CPT

## 2018-03-12 PROCEDURE — 85610 PROTHROMBIN TIME: CPT

## 2018-03-12 PROCEDURE — 93005 ELECTROCARDIOGRAM TRACING: CPT

## 2018-03-12 PROCEDURE — 93970 EXTREMITY STUDY: CPT

## 2018-03-12 PROCEDURE — 84484 ASSAY OF TROPONIN QUANT: CPT

## 2018-03-12 PROCEDURE — 85025 COMPLETE CBC W/AUTO DIFF WBC: CPT

## 2018-03-12 PROCEDURE — 85730 THROMBOPLASTIN TIME PARTIAL: CPT

## 2018-03-12 PROCEDURE — 83036 HEMOGLOBIN GLYCOSYLATED A1C: CPT

## 2018-03-12 PROCEDURE — 80048 BASIC METABOLIC PNL TOTAL CA: CPT

## 2018-03-12 PROCEDURE — 80061 LIPID PANEL: CPT

## 2018-03-12 PROCEDURE — G0378: CPT

## 2018-03-12 PROCEDURE — 82962 GLUCOSE BLOOD TEST: CPT

## 2018-03-12 PROCEDURE — 36415 COLL VENOUS BLD VENIPUNCTURE: CPT

## 2018-03-12 PROCEDURE — 80053 COMPREHEN METABOLIC PANEL: CPT

## 2018-03-12 PROCEDURE — 82550 ASSAY OF CK (CPK): CPT

## 2018-03-12 PROCEDURE — 99285 EMERGENCY DEPT VISIT HI MDM: CPT | Mod: 25

## 2018-03-12 PROCEDURE — 83735 ASSAY OF MAGNESIUM: CPT

## 2018-03-12 RX ORDER — ATORVASTATIN CALCIUM 80 MG/1
1 TABLET, FILM COATED ORAL
Qty: 0 | Refills: 0 | COMMUNITY

## 2018-03-12 RX ORDER — METOPROLOL TARTRATE 50 MG
1 TABLET ORAL
Qty: 30 | Refills: 1 | OUTPATIENT
Start: 2018-03-12 | End: 2018-05-10

## 2018-03-12 RX ORDER — ATORVASTATIN CALCIUM 80 MG/1
1 TABLET, FILM COATED ORAL
Qty: 30 | Refills: 1 | OUTPATIENT
Start: 2018-03-12 | End: 2018-05-10

## 2018-03-12 RX ORDER — ATORVASTATIN CALCIUM 80 MG/1
0 TABLET, FILM COATED ORAL
Qty: 0 | Refills: 0 | COMMUNITY

## 2018-03-12 RX ADMIN — ATORVASTATIN CALCIUM 40 MILLIGRAM(S): 80 TABLET, FILM COATED ORAL at 17:18

## 2018-03-12 RX ADMIN — Medication 1: at 06:41

## 2018-03-12 RX ADMIN — Medication 81 MILLIGRAM(S): at 14:04

## 2018-03-12 NOTE — DISCHARGE NOTE ADULT - PATIENT PORTAL LINK FT
You can access the [facility] Patient Portal, offered by [facility], by registering with the following website: [email or web address]

## 2018-03-12 NOTE — DISCHARGE NOTE ADULT - CARE PROVIDER_API CALL
Juan Cabral), Medicine  [address] [place] [address]  Phone: [phone]  Fax: [phone] Juan Cabral), Medicine  [address] [place] [address]  Phone: [phone]  Fax: [phone]    Niko Cano), Cardiac Electrophysiology; Cardiology; Cardiovascular Disease  [address] [place] [address]  Phone: [phone]  Fax: [phone]

## 2018-03-12 NOTE — DISCHARGE NOTE ADULT - CARE PLAN
Principal Discharge DX:	Chest pain  Goal:	You presented with Chest Pain.  Assessment and plan of treatment:	We did a blood test called a troponin, which was negative, indicated that you were NOT having a heart attack. We did an Echocardiogram which demonstrated normal pumping function in your heart. We did a stress test which showed---. If you experience Chest Pain, Shortness of breath, or worsening symptoms, please seek immediate medical attention. Please follow up with Dr. Cabral in 1-2 weeks.  Secondary Diagnosis:	Diabetes  Goal:	You have a history of diabetes.  Assessment and plan of treatment:	You have a diagnosis of diabetes and should continue all of your diabetic medications as prescribed. Please maintain a diet low in sugars and carbohydrates. Check your sugar levels regularly.  Secondary Diagnosis:	Hyperlipidemia  Goal:	You have a history of high cholesterol.  Assessment and plan of treatment:	Your cholesterol was slightly elevated while you were here. Therefore, we increased your Lipitor dose. Please continue Atorvastatin 40 mg once daily at night to keep your cholesterol low. High cholesterol contributes to heart disease. Please maintain a diet low in fat and cholesterol. Follow up with your PCP for further management of your cholesterol. Principal Discharge DX:	Chest pain  Goal:	You presented with Chest Pain.  Assessment and plan of treatment:	We did a blood test called a troponin, which was negative, indicated that you were NOT having a heart attack. We did an Echocardiogram which demonstrated normal pumping function in your heart. We did a stress test which was normal. If you experience Chest Pain, Shortness of breath, or worsening symptoms, please seek immediate medical attention. Please follow up with Dr. Cabral in 1-2 weeks.  Secondary Diagnosis:	Diabetes  Goal:	You have a history of diabetes.  Assessment and plan of treatment:	You have a diagnosis of diabetes and should continue all of your diabetic medications as prescribed. Please maintain a diet low in sugars and carbohydrates. Check your sugar levels regularly.  Secondary Diagnosis:	Hyperlipidemia  Goal:	You have a history of high cholesterol.  Assessment and plan of treatment:	Your cholesterol was slightly elevated while you were here. Therefore, we increased your Lipitor dose. Please continue Atorvastatin 40 mg once daily at night to keep your cholesterol low. High cholesterol contributes to heart disease. Please maintain a diet low in fat and cholesterol. Follow up with your PCP for further management of your cholesterol.  Secondary Diagnosis:	Premature atrial contraction  Goal:	While you were here, you were noted to have premature heart contractions.  Assessment and plan of treatment:	Therefore, we started you on a medication called Metoprolol 25mg. If you experience dizziness or lightheadedness on this medication, please STOP it and tell your cardiologist.

## 2018-03-12 NOTE — DISCHARGE NOTE ADULT - CARE PROVIDERS DIRECT ADDRESSES
[email or web address].

## 2018-03-12 NOTE — DISCHARGE NOTE ADULT - MEDICATION SUMMARY - MEDICATIONS TO CHANGE
I will SWITCH the dose or number of times a day I take the medications listed below when I get home from the hospital:    atorvastatin

## 2018-03-12 NOTE — PROGRESS NOTE ADULT - SUBJECTIVE AND OBJECTIVE BOX
Pt is stable  minimal palpitations    PAST MEDICAL & SURGICAL HISTORY:  Migraines  Diabetes type 2, controlled  H/O knee surgery      MEDICATIONS  (STANDING):  aspirin enteric coated 81 milliGRAM(s) Oral daily  atorvastatin 40 milliGRAM(s) Oral at bedtime  dextrose 5%. 1000 milliLiter(s) (50 mL/Hr) IV Continuous <Continuous>  dextrose 50% Injectable 12.5 Gram(s) IV Push once  dextrose 50% Injectable 25 Gram(s) IV Push once  dextrose 50% Injectable 25 Gram(s) IV Push once  heparin  Injectable 5000 Unit(s) SubCutaneous every 8 hours  insulin lispro (HumaLOG) corrective regimen sliding scale   SubCutaneous Before meals and at bedtime  metoprolol succinate ER 25 milliGRAM(s) Oral daily    MEDICATIONS  (PRN):  acetaminophen   Tablet. 650 milliGRAM(s) Oral once PRN Moderate Pain (4 - 6)  dextrose Gel 1 Dose(s) Oral once PRN Blood Glucose LESS THAN 70 milliGRAM(s)/deciliter  glucagon  Injectable 1 milliGRAM(s) IntraMuscular once PRN Glucose LESS THAN 70 milligrams/deciliter  nitroglycerin     SubLingual 0.4 milliGRAM(s) SubLingual every 5 minutes PRN Chest Pain    ICU Vital Signs Last 24 Hrs  T(C): 36.2 (12 Mar 2018 06:44), Max: 36.2 (12 Mar 2018 06:44)  T(F): 97.2 (12 Mar 2018 06:44), Max: 97.2 (12 Mar 2018 06:44)  HR: 78 (12 Mar 2018 05:26) (71 - 79)  BP: 105/59 (12 Mar 2018 05:26) (90/55 - 123/51)  BP(mean): --  ABP: --  ABP(mean): --  RR: 18 (12 Mar 2018 05:26) (18 - 18)  SpO2: 99% (12 Mar 2018 05:26) (97% - 100%)    Lungs clear   CV s1 s2  Abd soft  Ext stable    CARDIAC MARKERS ( 11 Mar 2018 06:32 )  x     / <0.01 ng/mL / x     / x     / x                              13.6   8.8   )-----------( 227      ( 12 Mar 2018 06:51 )             42.4   03-12    137  |  99  |  17  ----------------------------<  170<H>  4.8   |  25  |  0.78    Ca    9.7      12 Mar 2018 06:51  Mg     2.0     03-12

## 2018-03-12 NOTE — CONSULT NOTE ADULT - SUBJECTIVE AND OBJECTIVE BOX
HPI:  PATIENT IN STRESS TEST - template    52 year old female with HLD, DM, family history of CAD, who presented to [facility] with complaints of chest pain and palpitations.  EP called for further recommendations.    She presented to the ER complaining of palpitations and a mid sternal non radiating chest tightness that has been occurring for 3 weeks.   She deneis any aggravating or alleviating factors.  While on tele she has had APCs, blocked APCs and PVCs.  No sustained arrhythmias.        PAST MEDICAL & SURGICAL HISTORY:  Migraines  Diabetes type 2, controlled  H/O knee surgery      Family history of premature CAD (Father, Mother, Sibling)  No pertinent family history in first degree relatives      Social History: no drug use    Inpatient Medications:   acetaminophen   Tablet. 650 milliGRAM(s) Oral once PRN  aspirin enteric coated 81 milliGRAM(s) Oral daily  atorvastatin 40 milliGRAM(s) Oral at bedtime  heparin  Injectable 5000 Unit(s) SubCutaneous every 8 hours  insulin lispro (HumaLOG) corrective regimen sliding scale   SubCutaneous Before meals and at bedtime  metoprolol succinate ER 25 milliGRAM(s) Oral daily  nitroglycerin     SubLingual 0.4 milliGRAM(s) SubLingual every 5 minutes PRN    ALLERGIES  latex (Rash)  penicillins (Unknown)      ROS:   CONSTITUTIONAL: No fever   EYES: Pt denies  RESPIRATORY: No cough, wheezing, chills or hemoptysis; No Shortness of Breath  CARDIOVASCULAR: see HPI  GASTROINTESTINAL: Pt denies  NEUROLOGICAL: Pt denies  SKIN: Pt denies   PSYCHIATRIC: Pt denies  HEME/LYMPH: Pt denies    PHYSICAL:  T(C): 36.2 (03-12-18 @ 09:46), Max: 36.2 (03-12-18 @ 06:44)  HR: 76 (03-12-18 @ 08:30) (75 - 79)  BP: 100/64 (03-12-18 @ 08:30) (96/67 - 123/51)  RR: 16 (03-12-18 @ 08:30) (16 - 18)  SpO2: 97% (03-12-18 @ 08:30) (97% - 100%)  Appearance: NAD  Cardiovascular: Normal S1 S2, No JVD   Respiratory: Lungs clear to auscultation	   Neurologic: A&O x 3, No deficit noted  Extremities: No edema       LABS:                        13.6   8.8   )-----------( 227      ( 12 Mar 2018 06:51 )             42.4       137  |  99  |  17  ----------------------------<  170<H>  4.8   |  25  |  0.78    Mg     2.0       TSH 1.35  Troponin neg     EKG: NSR, PVC    Telemetry: NSR, APC, PVC (episode of bigeminy) - no sustained arrhythmia    ECHO: pending    Prior EP procedures: denies    Cath / stress / Cardiac CTa: at stress test     Assessment Plan: 52 year old female with HLD, DM, family history of CAD, who presented to [facility] with complaints of chest pain and palpitations. HPI:  PATIENT IN STRESS TEST - template    52 year old female with HLD, DM, family history of CAD, who presented to [facility] with complaints of chest pain and palpitations.  EP called for further recommendations.    She presented to the ER complaining of palpitations and a mid sternal non radiating chest tightness that has been occurring for 3 weeks.   She deneis any aggravating or alleviating factors.  While on tele she has had APCs, blocked APCs and PVCs.  No sustained arrhythmias.        PAST MEDICAL & SURGICAL HISTORY:  Migraines  Diabetes type 2, controlled  H/O knee surgery      Family history of premature CAD (Father, Mother, Sibling)  No pertinent family history in first degree relatives      Social History: no drug use    Inpatient Medications:   acetaminophen   Tablet. 650 milliGRAM(s) Oral once PRN  aspirin enteric coated 81 milliGRAM(s) Oral daily  atorvastatin 40 milliGRAM(s) Oral at bedtime  heparin  Injectable 5000 Unit(s) SubCutaneous every 8 hours  insulin lispro (HumaLOG) corrective regimen sliding scale   SubCutaneous Before meals and at bedtime  metoprolol succinate ER 25 milliGRAM(s) Oral daily  nitroglycerin     SubLingual 0.4 milliGRAM(s) SubLingual every 5 minutes PRN    ALLERGIES  latex (Rash)  penicillins (Unknown)      ROS:   CONSTITUTIONAL: No fever   EYES: Pt denies  RESPIRATORY: No cough, wheezing, chills or hemoptysis; No Shortness of Breath  CARDIOVASCULAR: see HPI  GASTROINTESTINAL: Pt denies  NEUROLOGICAL: Pt denies  SKIN: Pt denies   PSYCHIATRIC: Pt denies  HEME/LYMPH: Pt denies    PHYSICAL:  T(C): 36.2 (03-12-18 @ 09:46), Max: 36.2 (03-12-18 @ 06:44)  HR: 76 (03-12-18 @ 08:30) (75 - 79)  BP: 100/64 (03-12-18 @ 08:30) (96/67 - 123/51)  RR: 16 (03-12-18 @ 08:30) (16 - 18)  SpO2: 97% (03-12-18 @ 08:30) (97% - 100%)  Appearance: NAD  Cardiovascular: Normal S1 S2, No JVD   Respiratory: Lungs clear to auscultation	   Neurologic: A&O x 3, No deficit noted  Extremities: No edema       LABS:                        13.6   8.8   )-----------( 227      ( 12 Mar 2018 06:51 )             42.4       137  |  99  |  17  ----------------------------<  170<H>  4.8   |  25  |  0.78    Mg     2.0       TSH 1.35  Troponin neg     EKG: NSR, PVC    Telemetry: NSR, APC, PVC (episode of bigeminy) - no sustained arrhythmia    ECHO: pending    Prior EP procedures: denies    Cath / stress / Cardiac CTa: at stress test     Assessment Plan: 52 year old female with HLD, DM, family history of CAD, who presented to [facility] with complaints of chest pain and palpitations.  Telemetry consistent with APCs (some blocked) and PVCs.  No sustained arrhythmias.  Would recommend initiating Toprol (as done) and ischemic eval (in progress).  Will continue to monitor telemetry and assess if there are any sustained arrhythmias.  Please call [phone] with any questions or concerns.

## 2018-03-12 NOTE — DISCHARGE NOTE ADULT - MEDICATION SUMMARY - MEDICATIONS TO TAKE
I will START or STAY ON the medications listed below when I get home from the hospital:    aspirin 81 mg oral tablet  -- 1 tab(s) by mouth once a day  -- Indication: For Coronary Artery Disease    Janumet  -- Indication: For Diabetes    Lipitor 40 mg oral tablet  -- 1 tab(s) by mouth once a day   -- Avoid grapefruit and grapefruit juice while taking this medication.  Do not take this drug if you are pregnant.  It is very important that you take or use this exactly as directed.  Do not skip doses or discontinue unless directed by your doctor.  Obtain medical advice before taking any non-prescription drugs as some may affect the action of this medication.  Take with food or milk.    -- Indication: For Hyperlipidemia    metoprolol succinate 25 mg oral tablet, extended release  -- 1 tab(s) by mouth once a day  -- Indication: For Premature Contractions I will START or STAY ON the medications listed below when I get home from the hospital:    aspirin 81 mg oral tablet  -- 1 tab(s) by mouth once a day  -- Indication: For Cardiac Protection    Janumet  -- Indication: For Diabetes    Lipitor 40 mg oral tablet  -- 1 tab(s) by mouth once a day   -- Avoid grapefruit and grapefruit juice while taking this medication.  Do not take this drug if you are pregnant.  It is very important that you take or use this exactly as directed.  Do not skip doses or discontinue unless directed by your doctor.  Obtain medical advice before taking any non-prescription drugs as some may affect the action of this medication.  Take with food or milk.    -- Indication: For High Cholesterol    metoprolol succinate 25 mg oral tablet, extended release  -- 1 tab(s) by mouth once a day  -- Indication: For Premature atrial contraction

## 2018-03-12 NOTE — DISCHARGE NOTE ADULT - HOSPITAL COURSE
51 y/o Female with strong FHx premature CAD and PMHx of HLD, DM-II who presented to [facility] ED on 3/9/18 complaining of CP described as a tightness and palpitations x 3 weeks. Patient was admitted to [facility] for further cardiac workup. While she was here, her tele demonstrated NSR with PVCs on tele, and was started on Toprol XL 25mg. Her TSH was WNL. Her LDL was noted to be 120, so her Lipitor was increased lipitor from 10mg QHS to 40mg QHS. Her Echo 3/12 demonstrated Normal LV size and wall thickness, LV wall motion is normal, EF 55-60%. She underwent NST 3/12 which demonstrated ----. Pt is currently denying CP, SOB, palpitations, N/V. Labs, telemetry and vital signs reviewed. Lungs are clear to ausculation b/l and heart sounds are normal.  Temp: 98.9 HR: 58 BP: 124/61 RR: 16 SpO2: 99% on RA. Pt will go home without social work needs. All appropriate discharge instructions given and pt instructed to return to nearest ED if they experience any chest pain, shortness of breath, or worsening symptoms. All new medications indications and side effects explained to pt and e-prescribed to pt's pharmacy. Labs and vitals remained stable overnight. Pt deemed stable for d/c 3/12 by Dr. Cabral. Pt will follow up with Dr. Cabral. 53 y/o Female with strong FHx premature CAD and PMHx of HLD, DM-II who presented to [facility] ED on 3/9/18 complaining of CP described as a tightness and palpitations x 3 weeks. Patient was admitted to [facility] for further cardiac workup. While she was here, her tele demonstrated NSR with PVCs on tele, and was started on Toprol XL 25mg. Her TSH was WNL. Her LDL was noted to be 120, so her Lipitor was increased lipitor from 10mg QHS to 40mg QHS. Her Echo 3/12 demonstrated Normal LV size and wall thickness, LV wall motion is normal, EF 55-60%. She underwent NST 3/12 which was normal, per verbal report from Dr. Fitzpatrick. Pt is currently denying CP, SOB, palpitations, N/V. Labs, telemetry and vital signs reviewed. Lungs are clear to ausculation b/l and heart sounds are normal.  Temp: 98.9 HR: 58 BP: 124/61 RR: 16 SpO2: 99% on RA. Pt will go home without social work needs. All appropriate discharge instructions given and pt instructed to return to nearest ED if they experience any chest pain, shortness of breath, or worsening symptoms. All new medications indications and side effects explained to pt and e-prescribed to pt's pharmacy. Labs and vitals remained stable overnight. Pt deemed stable for d/c 3/12 by Dr. Cabral. Pt will follow up with Dr. Cabral.

## 2018-03-12 NOTE — DISCHARGE NOTE ADULT - PLAN OF CARE
You presented with Chest Pain. We did a blood test called a troponin, which was negative, indicated that you were NOT having a heart attack. We did an Echocardiogram which demonstrated normal pumping function in your heart. We did a stress test which showed---. If you experience Chest Pain, Shortness of breath, or worsening symptoms, please seek immediate medical attention. Please follow up with Dr. Cabral in 1-2 weeks. You have a history of diabetes. You have a diagnosis of diabetes and should continue all of your diabetic medications as prescribed. Please maintain a diet low in sugars and carbohydrates. Check your sugar levels regularly. You have a history of high cholesterol. Your cholesterol was slightly elevated while you were here. Therefore, we increased your Lipitor dose. Please continue Atorvastatin 40 mg once daily at night to keep your cholesterol low. High cholesterol contributes to heart disease. Please maintain a diet low in fat and cholesterol. Follow up with your PCP for further management of your cholesterol. We did a blood test called a troponin, which was negative, indicated that you were NOT having a heart attack. We did an Echocardiogram which demonstrated normal pumping function in your heart. We did a stress test which was normal. If you experience Chest Pain, Shortness of breath, or worsening symptoms, please seek immediate medical attention. Please follow up with Dr. Cabral in 1-2 weeks. While you were here, you were noted to have premature heart contractions. Therefore, we started you on a medication called Metoprolol 25mg. If you experience dizziness or lightheadedness on this medication, please STOP it and tell your cardiologist.

## 2018-03-15 DIAGNOSIS — E11.9 TYPE 2 DIABETES MELLITUS WITHOUT COMPLICATIONS: ICD-10-CM

## 2018-03-15 DIAGNOSIS — I49.3 VENTRICULAR PREMATURE DEPOLARIZATION: ICD-10-CM

## 2018-03-15 DIAGNOSIS — Z82.49 FAMILY HISTORY OF ISCHEMIC HEART DISEASE AND OTHER DISEASES OF THE CIRCULATORY SYSTEM: ICD-10-CM

## 2018-03-15 DIAGNOSIS — R07.9 CHEST PAIN, UNSPECIFIED: ICD-10-CM

## 2018-03-15 DIAGNOSIS — Z88.8 ALLERGY STATUS TO OTHER DRUGS, MEDICAMENTS AND BIOLOGICAL SUBSTANCES STATUS: ICD-10-CM

## 2018-03-15 DIAGNOSIS — E78.5 HYPERLIPIDEMIA, UNSPECIFIED: ICD-10-CM

## 2018-03-15 DIAGNOSIS — I49.1 ATRIAL PREMATURE DEPOLARIZATION: ICD-10-CM

## 2018-03-15 DIAGNOSIS — R79.1 ABNORMAL COAGULATION PROFILE: ICD-10-CM

## 2018-03-15 DIAGNOSIS — R19.7 DIARRHEA, UNSPECIFIED: ICD-10-CM

## 2018-03-15 DIAGNOSIS — Z98.890 OTHER SPECIFIED POSTPROCEDURAL STATES: ICD-10-CM

## 2018-03-15 DIAGNOSIS — Z79.82 LONG TERM (CURRENT) USE OF ASPIRIN: ICD-10-CM

## 2018-03-15 DIAGNOSIS — G43.909 MIGRAINE, UNSPECIFIED, NOT INTRACTABLE, WITHOUT STATUS MIGRAINOSUS: ICD-10-CM

## 2018-04-30 ENCOUNTER — TRANSCRIPTION ENCOUNTER (OUTPATIENT)
Age: 53
End: 2018-04-30

## 2018-06-07 ENCOUNTER — TRANSCRIPTION ENCOUNTER (OUTPATIENT)
Age: 53
End: 2018-06-07

## 2018-08-27 PROBLEM — G43.909 MIGRAINE, UNSPECIFIED, NOT INTRACTABLE, WITHOUT STATUS MIGRAINOSUS: Chronic | Status: ACTIVE | Noted: 2018-03-09

## 2018-08-27 PROBLEM — E11.9 TYPE 2 DIABETES MELLITUS WITHOUT COMPLICATIONS: Chronic | Status: ACTIVE | Noted: 2018-03-09

## 2018-09-26 ENCOUNTER — APPOINTMENT (OUTPATIENT)
Dept: HEART AND VASCULAR | Facility: CLINIC | Age: 53
End: 2018-09-26
Payer: COMMERCIAL

## 2018-09-26 VITALS
HEIGHT: 60 IN | DIASTOLIC BLOOD PRESSURE: 66 MMHG | SYSTOLIC BLOOD PRESSURE: 122 MMHG | WEIGHT: 145 LBS | BODY MASS INDEX: 28.47 KG/M2 | HEART RATE: 77 BPM

## 2018-09-26 DIAGNOSIS — Z86.39 PERSONAL HISTORY OF OTHER ENDOCRINE, NUTRITIONAL AND METABOLIC DISEASE: ICD-10-CM

## 2018-09-26 DIAGNOSIS — Z82.49 FAMILY HISTORY OF ISCHEMIC HEART DISEASE AND OTHER DISEASES OF THE CIRCULATORY SYSTEM: ICD-10-CM

## 2018-09-26 DIAGNOSIS — R00.2 PALPITATIONS: ICD-10-CM

## 2018-09-26 DIAGNOSIS — Z86.69 PERSONAL HISTORY OF OTHER DISEASES OF THE NERVOUS SYSTEM AND SENSE ORGANS: ICD-10-CM

## 2018-09-26 DIAGNOSIS — E78.5 HYPERLIPIDEMIA, UNSPECIFIED: ICD-10-CM

## 2018-09-26 PROCEDURE — 99213 OFFICE O/P EST LOW 20 MIN: CPT

## 2018-09-26 PROCEDURE — 93000 ELECTROCARDIOGRAM COMPLETE: CPT

## 2018-09-26 RX ORDER — SITAGLIPTIN AND METFORMIN HYDROCHLORIDE 50; 500 MG/1; MG/1
50-500 TABLET, FILM COATED ORAL TWICE DAILY
Refills: 0 | Status: ACTIVE | COMMUNITY
Start: 2018-09-26

## 2018-09-26 RX ORDER — ATORVASTATIN CALCIUM 20 MG/1
20 TABLET, FILM COATED ORAL DAILY
Refills: 0 | Status: ACTIVE | COMMUNITY
Start: 2018-09-26

## 2018-09-26 RX ORDER — METOPROLOL SUCCINATE 25 MG/1
25 TABLET, EXTENDED RELEASE ORAL DAILY
Refills: 5 | Status: ACTIVE | COMMUNITY
Start: 2018-09-26

## 2018-09-26 RX ORDER — ASPIRIN ENTERIC COATED TABLETS 81 MG 81 MG/1
81 TABLET, DELAYED RELEASE ORAL DAILY
Refills: 5 | Status: ACTIVE | COMMUNITY
Start: 2018-09-26

## 2018-12-01 ENCOUNTER — TRANSCRIPTION ENCOUNTER (OUTPATIENT)
Age: 53
End: 2018-12-01

## 2018-12-19 ENCOUNTER — TRANSCRIPTION ENCOUNTER (OUTPATIENT)
Age: 53
End: 2018-12-19

## 2019-05-26 ENCOUNTER — TRANSCRIPTION ENCOUNTER (OUTPATIENT)
Age: 54
End: 2019-05-26

## 2019-06-21 ENCOUNTER — TRANSCRIPTION ENCOUNTER (OUTPATIENT)
Age: 54
End: 2019-06-21

## 2020-03-04 NOTE — ED PROVIDER NOTE - INPATIENT RESIDENT/ACP NOTIFIED DATE
Chief Complaint   Patient presents with   • Follow-up     left foot ganglion cyst       Subjective: This is a 65 year old female patient who returns to the clinic for followup of recurrent/chronic cyst on the lateral aspect of the left hindfoot.  She would like to have it removed.  Is not quite ready to schedule for surgery.  Denies any other complaints.  Denies any nausea, fever, chills, night sweats, vomiting, or other systemic symptoms.    Objective:  Integument: Warm, dry, supple. No open lesions or signs of infection.  Palpable mass on the lateral aspect the left hindfoot.  This is consistent with ganglion.  Vascular: Dorsalis pedis 2/4, posterior tiial 2/4 bilaterally, capillary filling time is less than 3seconds bilaterally.   Neurological:  Intact to light touch and painful stimuli as well as [place] monofilament.  Musculoskeletal:  Significant pain of the ganglion cyst left foot    Assessment:  1. Ganglion cyst of left foot        Plan:  Discussed in depth, in detail with the patient condition as well as treatment options.  Discussed surgical removal.  I would respectfully request preoperative History & Physical exam with surgical clearance be performed prior to the surgery date.  Discussed risks and benefits with the patient.  Risks discussed included but were not limited to the following: infection, chronic pain, swelling, nerve damage/numbness, recurrence, loss of function, hardware failure, amputation and death.  Call for scheduling  No orders of the defined types were placed in this encounter.    Return if symptoms worsen or fail to improve.    
09-Mar-2018 19:15

## 2023-12-06 NOTE — ED ADULT TRIAGE NOTE - CHIEF COMPLAINT QUOTE
Medication(s) requested: Vyvanse 30 mg  Last office visit: 10/19/23  Next office visit: 4/24/24  Last refill given: 11/6/23  Last refill picked up from pharmacy: no access to PDMP  Contract present: Yes  Date of contract: 8/5/21  Last urine drug screen: 10/19/23    Is the patient due for refill of this medication(s): Yes  PDMP review: no access to PDMP     chest pain and palpitations